# Patient Record
Sex: MALE | Race: WHITE | NOT HISPANIC OR LATINO | Employment: STUDENT | URBAN - METROPOLITAN AREA
[De-identification: names, ages, dates, MRNs, and addresses within clinical notes are randomized per-mention and may not be internally consistent; named-entity substitution may affect disease eponyms.]

---
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
## 2022-09-02 ENCOUNTER — OFFICE VISIT (OUTPATIENT)
Dept: URGENT CARE | Facility: URGENT CARE | Age: 22
End: 2022-09-02
Payer: COMMERCIAL

## 2022-09-02 VITALS
SYSTOLIC BLOOD PRESSURE: 120 MMHG | HEART RATE: 96 BPM | DIASTOLIC BLOOD PRESSURE: 83 MMHG | OXYGEN SATURATION: 97 % | TEMPERATURE: 98.1 F

## 2022-09-02 DIAGNOSIS — J02.9 PHARYNGITIS, UNSPECIFIED ETIOLOGY: Primary | ICD-10-CM

## 2022-09-02 DIAGNOSIS — T50.905A PILL ESOPHAGITIS: ICD-10-CM

## 2022-09-02 DIAGNOSIS — K20.80 PILL ESOPHAGITIS: ICD-10-CM

## 2022-09-02 LAB
DEPRECATED S PYO AG THROAT QL EIA: NEGATIVE
GROUP A STREP BY PCR: NOT DETECTED

## 2022-09-02 PROCEDURE — U0005 INFEC AGEN DETEC AMPLI PROBE: HCPCS | Performed by: EMERGENCY MEDICINE

## 2022-09-02 PROCEDURE — 99203 OFFICE O/P NEW LOW 30 MIN: CPT | Mod: CS | Performed by: EMERGENCY MEDICINE

## 2022-09-02 PROCEDURE — U0003 INFECTIOUS AGENT DETECTION BY NUCLEIC ACID (DNA OR RNA); SEVERE ACUTE RESPIRATORY SYNDROME CORONAVIRUS 2 (SARS-COV-2) (CORONAVIRUS DISEASE [COVID-19]), AMPLIFIED PROBE TECHNIQUE, MAKING USE OF HIGH THROUGHPUT TECHNOLOGIES AS DESCRIBED BY CMS-2020-01-R: HCPCS | Performed by: EMERGENCY MEDICINE

## 2022-09-02 PROCEDURE — 87651 STREP A DNA AMP PROBE: CPT | Performed by: EMERGENCY MEDICINE

## 2022-09-02 NOTE — PROGRESS NOTES
Assessment & Plan     Diagnosis:    (J02.9) Pharyngitis, unspecified etiology  (primary encounter diagnosis)    (K20.80,  T50.235K) Pill esophagitis  Comment: Dayquil - Acetaminophen, Dextromethorphan and Phenylephrine     Medical Decision Making:  Jarrett Moss is a 22 year old male who presents for evaluation of a sore throat and clinical evidence of pharyngitis; he is concerned that he swallowed a dayquil pill and it got stuck yesterday. He has been eating and drinking normally, has no emesis, melena/abnormal stools and a benign abdominal exam here; I am not concerned for esophageal foreign body, perforation or significant esophagitis.  The rapid strep test is negative, and formal culture has been set up in the lab. There is no clinical evidence of peritonsillar abscess, retropharyngeal abscess, Lemierre's Syndrome, epiglottis, or Curtis's angina. The etiology is most likely viral.   I have recommended treatment with analgesics, and we will await formal culture results.  If the culture is positive, provider will call the patient to initiate anti-microbial therapy. Go to the ER if increasing pain, change in voice, neck pain, vomiting, fever, or shortness of breath. Follow-up with primary physician if not improving in 3-5 days. Given well appearance, I would not test further for other etiologies of serious bacterial infections.     Patient has a concurrent URI, making viral etiologies more likely.  If sore throat persists, mono testing indicated.    Patient voices understanding and agreement with the plan including reasons to go to the ER immediately as well as to be seen by a more consistent care-giver, such as their PCP, if the symptoms persist more than 3 days.       Reno Jane PA-C  [facility]    Subjective     Jarrett Moss is a 22 year old male who presents to clinic today for the following health issues:  Chief Complaint   Patient presents with     URI     Pt has had a cold for  "the last couple of days, cough, stomach has cramps, throat hurts, trouble sleeping      Throat Problem     Pt believes a dayquil pill is stuck in throat       HPI    URI Adult    Onset of symptoms was 2 day(s) ago.  Course of illness is worsening.    Severity moderate  Current and Associated symptoms: runny nose, cough - non-productive, sore throat, facial pain/pressure, headache, body aches, fatigue and \"a feeling that I got a dayquil pill stuck in my throat.\"  Treatment measures tried include Tylenol/Ibuprofen and Dayquil.  Predisposing factors include ill contact: Family member .    Patient denies any difficulties swallowing or breathing, dark or bloody stools, emesis, abdominal pain, chest pain, SOB. He notes he has been eating and drinking since; it just hurts going down a little.     Review of Systems    See HPI    Objective      Vitals: /83 (BP Location: Right arm, Patient Position: Sitting, Cuff Size: Adult Regular)   Pulse 96   Temp 98.1  F (36.7  C) (Temporal)   SpO2 97%   Resp: 16    Patient Vitals for the past 24 hrs:   BP Temp Temp src Pulse SpO2   09/02/22 1416 120/83 98.1  F (36.7  C) Temporal 96 97 %       Vital signs reviewed by: Reno Jane PA-C    Physical Exam   Constitutional: Patient is alert and cooperative. No acute distress.  Ears: Right TM is . Left TM is pale/grey. External ear canals are normal.  Mouth: Mucous membranes are moist. Normal tongue and tonsil. Posterior oropharynx is erythematous. No exudates. Uvula is midline.  Eyes: Conjunctivae, EOMI and lids are normal. PERRL.  Cardiovascular: Regular rate and rhythm  Pulmonary/Chest: Lungs are clear to auscultation throughout. Effort normal. No respiratory distress. No wheezes, rales or rhonchi.  Neurological: Alert and oriented x3.   Skin: No rash noted on visualized skin.  Psychiatric:The patient has a normal mood and affect.     Labs/Imaging:  Results for orders placed or performed in visit on 09/02/22   Streptococcus A " Rapid Screen w/Reflex to PCR - Clinic Collect     Status: Normal    Specimen: Throat; Swab   Result Value Ref Range    Group A Strep antigen Negative Negative                                     Reno Jane PA-C, September 2, 2022

## 2022-09-03 LAB — SARS-COV-2 RNA RESP QL NAA+PROBE: NEGATIVE
